# Patient Record
Sex: MALE | Race: OTHER | HISPANIC OR LATINO | URBAN - METROPOLITAN AREA
[De-identification: names, ages, dates, MRNs, and addresses within clinical notes are randomized per-mention and may not be internally consistent; named-entity substitution may affect disease eponyms.]

---

## 2018-07-12 ENCOUNTER — INPATIENT (INPATIENT)
Facility: HOSPITAL | Age: 43
LOS: 3 days | Discharge: HOME | DRG: 897 | End: 2018-07-16
Attending: INTERNAL MEDICINE | Admitting: INTERNAL MEDICINE
Payer: SELF-PAY

## 2018-07-12 VITALS
OXYGEN SATURATION: 98 % | HEIGHT: 64 IN | TEMPERATURE: 99 F | WEIGHT: 134.92 LBS | HEART RATE: 88 BPM | RESPIRATION RATE: 16 BRPM | DIASTOLIC BLOOD PRESSURE: 110 MMHG | SYSTOLIC BLOOD PRESSURE: 184 MMHG

## 2018-07-12 LAB
ALBUMIN SERPL ELPH-MCNC: 3.7 G/DL — SIGNIFICANT CHANGE UP (ref 3.5–5)
ALP SERPL-CCNC: 248 U/L — HIGH (ref 40–120)
ALT FLD-CCNC: 62 U/L DA — HIGH (ref 10–60)
ANION GAP SERPL CALC-SCNC: 8 MMOL/L — SIGNIFICANT CHANGE UP (ref 5–17)
AST SERPL-CCNC: 78 U/L — HIGH (ref 10–40)
BASOPHILS # BLD AUTO: 0.1 K/UL — SIGNIFICANT CHANGE UP (ref 0–0.2)
BASOPHILS NFR BLD AUTO: 1.5 % — SIGNIFICANT CHANGE UP (ref 0–2)
BILIRUB SERPL-MCNC: 0.7 MG/DL — SIGNIFICANT CHANGE UP (ref 0.2–1.2)
BUN SERPL-MCNC: 12 MG/DL — SIGNIFICANT CHANGE UP (ref 7–18)
CALCIUM SERPL-MCNC: 9.2 MG/DL — SIGNIFICANT CHANGE UP (ref 8.4–10.5)
CHLORIDE SERPL-SCNC: 107 MMOL/L — SIGNIFICANT CHANGE UP (ref 96–108)
CO2 SERPL-SCNC: 26 MMOL/L — SIGNIFICANT CHANGE UP (ref 22–31)
CREAT SERPL-MCNC: 0.85 MG/DL — SIGNIFICANT CHANGE UP (ref 0.5–1.3)
EOSINOPHIL # BLD AUTO: 0.1 K/UL — SIGNIFICANT CHANGE UP (ref 0–0.5)
EOSINOPHIL NFR BLD AUTO: 1 % — SIGNIFICANT CHANGE UP (ref 0–6)
GLUCOSE SERPL-MCNC: 90 MG/DL — SIGNIFICANT CHANGE UP (ref 70–99)
HCT VFR BLD CALC: 41.9 % — SIGNIFICANT CHANGE UP (ref 39–50)
HGB BLD-MCNC: 14.1 G/DL — SIGNIFICANT CHANGE UP (ref 13–17)
LYMPHOCYTES # BLD AUTO: 1.2 K/UL — SIGNIFICANT CHANGE UP (ref 1–3.3)
LYMPHOCYTES # BLD AUTO: 15.3 % — SIGNIFICANT CHANGE UP (ref 13–44)
MAGNESIUM SERPL-MCNC: 1.6 MG/DL — SIGNIFICANT CHANGE UP (ref 1.6–2.6)
MCHC RBC-ENTMCNC: 33.6 GM/DL — SIGNIFICANT CHANGE UP (ref 32–36)
MCHC RBC-ENTMCNC: 36.2 PG — HIGH (ref 27–34)
MCV RBC AUTO: 107.8 FL — HIGH (ref 80–100)
MONOCYTES # BLD AUTO: 0.4 K/UL — SIGNIFICANT CHANGE UP (ref 0–0.9)
MONOCYTES NFR BLD AUTO: 5.8 % — SIGNIFICANT CHANGE UP (ref 2–14)
NEUTROPHILS # BLD AUTO: 5.9 K/UL — SIGNIFICANT CHANGE UP (ref 1.8–7.4)
NEUTROPHILS NFR BLD AUTO: 76.5 % — SIGNIFICANT CHANGE UP (ref 43–77)
PLATELET # BLD AUTO: 226 K/UL — SIGNIFICANT CHANGE UP (ref 150–400)
POTASSIUM SERPL-MCNC: 2.9 MMOL/L — CRITICAL LOW (ref 3.5–5.3)
POTASSIUM SERPL-SCNC: 2.9 MMOL/L — CRITICAL LOW (ref 3.5–5.3)
PROT SERPL-MCNC: 8 G/DL — SIGNIFICANT CHANGE UP (ref 6–8.3)
RBC # BLD: 3.89 M/UL — LOW (ref 4.2–5.8)
RBC # FLD: 13.8 % — SIGNIFICANT CHANGE UP (ref 10.3–14.5)
SODIUM SERPL-SCNC: 141 MMOL/L — SIGNIFICANT CHANGE UP (ref 135–145)
WBC # BLD: 7.7 K/UL — SIGNIFICANT CHANGE UP (ref 3.8–10.5)
WBC # FLD AUTO: 7.7 K/UL — SIGNIFICANT CHANGE UP (ref 3.8–10.5)

## 2018-07-12 RX ORDER — SODIUM CHLORIDE 9 MG/ML
2000 INJECTION INTRAMUSCULAR; INTRAVENOUS; SUBCUTANEOUS ONCE
Qty: 0 | Refills: 0 | Status: COMPLETED | OUTPATIENT
Start: 2018-07-12 | End: 2018-07-12

## 2018-07-12 RX ORDER — POTASSIUM CHLORIDE 20 MEQ
60 PACKET (EA) ORAL ONCE
Qty: 0 | Refills: 0 | Status: COMPLETED | OUTPATIENT
Start: 2018-07-12 | End: 2018-07-12

## 2018-07-12 RX ADMIN — Medication 2 MILLIGRAM(S): at 23:16

## 2018-07-12 RX ADMIN — Medication 60 MILLIEQUIVALENT(S): at 23:19

## 2018-07-12 RX ADMIN — SODIUM CHLORIDE 2000 MILLILITER(S): 9 INJECTION INTRAMUSCULAR; INTRAVENOUS; SUBCUTANEOUS at 22:40

## 2018-07-12 NOTE — ED PROVIDER NOTE - OBJECTIVE STATEMENT
43 y/o M pt w/ no PMHx presents c/o seizure-like episode x today. Pt was working when he noticed body shaking uncontrollably. Had syncopal episode. Does not remember falling. Fell into face. Woke up to EMS. EMS told pt that he had a seizure. Did not provide details about the seizure episode. Had similar episode of presyncope in the past. Pt currently feels fatigue and trembling in the ED. Reports having blurry vision lately.  Denies CP, SOB, recent head trauma. Smoker. Drinks a pint of liquor 3 times a week. Relates drinking more than usual lately. Last drink was 3 days ago. Pt has been drinking for years. NKDA. 43 y/o M pt w/ no PMHx presents c/o seizure-like episode x today. Pt was working when he noticed body shaking uncontrollably, following which he remembers awakening to EMS standing around him. States was told he that he had had a seizure, though no further details provided. No prior history. Mild confusion following event. Pt currently feels fatigue and trembling in the ED. Reports having blurry vision lately x2 months. Denies CP, SOB, recent head trauma. Smoker. Drinks a pint of liquor 3 times a week. Relates drinking more than usual lately. Last drink was 3 days ago. Pt has been drinking for years. NKDA.

## 2018-07-12 NOTE — ED PROVIDER NOTE - MEDICAL DECISION MAKING DETAILS
Tremulous, reported seizure, diaphoretic, tachy, concerning for EtOH withdrawal seizure. Head NCAT, CT head negative. ECG unremarkable. Give Ativan with improvement to CIWA of 2. Admitted to internal medicine for further monitoring, w/u, and care.  Other diagnoses: transaminitis, cervical spondylosis, fall initial encounter

## 2018-07-12 NOTE — ED PROVIDER NOTE - CARE PLAN
Principal Discharge DX:	Alcohol withdrawal seizure with complication Principal Discharge DX:	Alcohol withdrawal seizure with complication  Secondary Diagnosis:	Hypokalemia  Secondary Diagnosis:	HTN (hypertension)

## 2018-07-12 NOTE — ED PROVIDER NOTE - PHYSICAL EXAMINATION
Afebrile, HTN, hemodynamically stable, saturating well  Trembling, mild diaphoresis  Head NCAT  PERRL, EOMI, icteric  MM dry  Tachy, reg rhythm, nml S1/S2, no m/r/g  Lungs diffuse rhonchi, no w/r/r  Abd soft, NT, ND, nml BS, no rebound or guarding  AAOx3, CN's 3-12 intact other than mild R tongue deviation, motor 5/5 and sens symmetric in all extrems  ANDRADE spontaneously  Skin warm, well perfused, abrasions to b/l dorsal hands, full wrist/elbow flexion/extension/supin/pronation, 2+ radial pulse, <2 sec cap refill

## 2018-07-13 DIAGNOSIS — F10.239 ALCOHOL DEPENDENCE WITH WITHDRAWAL, UNSPECIFIED: ICD-10-CM

## 2018-07-13 DIAGNOSIS — R74.8 ABNORMAL LEVELS OF OTHER SERUM ENZYMES: ICD-10-CM

## 2018-07-13 DIAGNOSIS — Z29.9 ENCOUNTER FOR PROPHYLACTIC MEASURES, UNSPECIFIED: ICD-10-CM

## 2018-07-13 LAB
ALBUMIN SERPL ELPH-MCNC: 3.1 G/DL — LOW (ref 3.5–5)
ALP SERPL-CCNC: 195 U/L — HIGH (ref 40–120)
ALT FLD-CCNC: 49 U/L DA — SIGNIFICANT CHANGE UP (ref 10–60)
ANION GAP SERPL CALC-SCNC: 10 MMOL/L — SIGNIFICANT CHANGE UP (ref 5–17)
AST SERPL-CCNC: 60 U/L — HIGH (ref 10–40)
BASOPHILS # BLD AUTO: 0.1 K/UL — SIGNIFICANT CHANGE UP (ref 0–0.2)
BASOPHILS NFR BLD AUTO: 1.5 % — SIGNIFICANT CHANGE UP (ref 0–2)
BILIRUB SERPL-MCNC: 0.5 MG/DL — SIGNIFICANT CHANGE UP (ref 0.2–1.2)
BUN SERPL-MCNC: 7 MG/DL — SIGNIFICANT CHANGE UP (ref 7–18)
CALCIUM SERPL-MCNC: 8.1 MG/DL — LOW (ref 8.4–10.5)
CHLORIDE SERPL-SCNC: 109 MMOL/L — HIGH (ref 96–108)
CHOLEST SERPL-MCNC: 169 MG/DL — SIGNIFICANT CHANGE UP (ref 10–199)
CO2 SERPL-SCNC: 22 MMOL/L — SIGNIFICANT CHANGE UP (ref 22–31)
CREAT SERPL-MCNC: 0.55 MG/DL — SIGNIFICANT CHANGE UP (ref 0.5–1.3)
EOSINOPHIL # BLD AUTO: 0.1 K/UL — SIGNIFICANT CHANGE UP (ref 0–0.5)
EOSINOPHIL NFR BLD AUTO: 2 % — SIGNIFICANT CHANGE UP (ref 0–6)
ETHANOL SERPL-MCNC: <3 MG/DL — SIGNIFICANT CHANGE UP (ref 0–10)
FOLATE SERPL-MCNC: 6.5 NG/ML — SIGNIFICANT CHANGE UP
GLUCOSE SERPL-MCNC: 86 MG/DL — SIGNIFICANT CHANGE UP (ref 70–99)
HBA1C BLD-MCNC: 5.1 % — SIGNIFICANT CHANGE UP (ref 4–5.6)
HCT VFR BLD CALC: 37.9 % — LOW (ref 39–50)
HDLC SERPL-MCNC: 81 MG/DL — SIGNIFICANT CHANGE UP (ref 40–125)
HGB BLD-MCNC: 12.7 G/DL — LOW (ref 13–17)
HIV 1 & 2 AB SERPL IA.RAPID: SIGNIFICANT CHANGE UP
LIPID PNL WITH DIRECT LDL SERPL: 70 MG/DL — SIGNIFICANT CHANGE UP
LYMPHOCYTES # BLD AUTO: 1.6 K/UL — SIGNIFICANT CHANGE UP (ref 1–3.3)
LYMPHOCYTES # BLD AUTO: 20.9 % — SIGNIFICANT CHANGE UP (ref 13–44)
MAGNESIUM SERPL-MCNC: 1.5 MG/DL — LOW (ref 1.6–2.6)
MCHC RBC-ENTMCNC: 33.6 GM/DL — SIGNIFICANT CHANGE UP (ref 32–36)
MCHC RBC-ENTMCNC: 36 PG — HIGH (ref 27–34)
MCV RBC AUTO: 107.2 FL — HIGH (ref 80–100)
MONOCYTES # BLD AUTO: 0.5 K/UL — SIGNIFICANT CHANGE UP (ref 0–0.9)
MONOCYTES NFR BLD AUTO: 6.1 % — SIGNIFICANT CHANGE UP (ref 2–14)
NEUTROPHILS # BLD AUTO: 5.2 K/UL — SIGNIFICANT CHANGE UP (ref 1.8–7.4)
NEUTROPHILS NFR BLD AUTO: 69.5 % — SIGNIFICANT CHANGE UP (ref 43–77)
PHOSPHATE SERPL-MCNC: 3.7 MG/DL — SIGNIFICANT CHANGE UP (ref 2.5–4.5)
PLATELET # BLD AUTO: 206 K/UL — SIGNIFICANT CHANGE UP (ref 150–400)
POTASSIUM SERPL-MCNC: 2.8 MMOL/L — CRITICAL LOW (ref 3.5–5.3)
POTASSIUM SERPL-SCNC: 2.8 MMOL/L — CRITICAL LOW (ref 3.5–5.3)
PROT SERPL-MCNC: 6.7 G/DL — SIGNIFICANT CHANGE UP (ref 6–8.3)
RBC # BLD: 3.54 M/UL — LOW (ref 4.2–5.8)
RBC # FLD: 13.3 % — SIGNIFICANT CHANGE UP (ref 10.3–14.5)
SODIUM SERPL-SCNC: 141 MMOL/L — SIGNIFICANT CHANGE UP (ref 135–145)
TOTAL CHOLESTEROL/HDL RATIO MEASUREMENT: 2.1 RATIO — LOW (ref 3.4–9.6)
TRIGL SERPL-MCNC: 88 MG/DL — SIGNIFICANT CHANGE UP (ref 10–149)
TSH SERPL-MCNC: 0.3 UU/ML — LOW (ref 0.34–4.82)
VIT B12 SERPL-MCNC: 256 PG/ML — SIGNIFICANT CHANGE UP (ref 232–1245)
WBC # BLD: 7.5 K/UL — SIGNIFICANT CHANGE UP (ref 3.8–10.5)
WBC # FLD AUTO: 7.5 K/UL — SIGNIFICANT CHANGE UP (ref 3.8–10.5)

## 2018-07-13 PROCEDURE — 99053 MED SERV 10PM-8AM 24 HR FAC: CPT

## 2018-07-13 PROCEDURE — 99291 CRITICAL CARE FIRST HOUR: CPT

## 2018-07-13 PROCEDURE — 95819 EEG AWAKE AND ASLEEP: CPT | Mod: 26

## 2018-07-13 PROCEDURE — 70450 CT HEAD/BRAIN W/O DYE: CPT | Mod: 26

## 2018-07-13 PROCEDURE — 99254 IP/OBS CNSLTJ NEW/EST MOD 60: CPT

## 2018-07-13 PROCEDURE — 72125 CT NECK SPINE W/O DYE: CPT | Mod: 26

## 2018-07-13 RX ORDER — POTASSIUM CHLORIDE 20 MEQ
40 PACKET (EA) ORAL EVERY 4 HOURS
Qty: 0 | Refills: 0 | Status: COMPLETED | OUTPATIENT
Start: 2018-07-13 | End: 2018-07-13

## 2018-07-13 RX ORDER — SODIUM CHLORIDE 9 MG/ML
1000 INJECTION, SOLUTION INTRAVENOUS
Qty: 0 | Refills: 0 | Status: COMPLETED | OUTPATIENT
Start: 2018-07-13 | End: 2018-07-14

## 2018-07-13 RX ORDER — MAGNESIUM SULFATE 500 MG/ML
2 VIAL (ML) INJECTION ONCE
Qty: 0 | Refills: 0 | Status: DISCONTINUED | OUTPATIENT
Start: 2018-07-13 | End: 2018-07-13

## 2018-07-13 RX ORDER — THIAMINE MONONITRATE (VIT B1) 100 MG
100 TABLET ORAL ONCE
Qty: 0 | Refills: 0 | Status: COMPLETED | OUTPATIENT
Start: 2018-07-13 | End: 2018-07-13

## 2018-07-13 RX ORDER — THIAMINE MONONITRATE (VIT B1) 100 MG
100 TABLET ORAL DAILY
Qty: 0 | Refills: 0 | Status: COMPLETED | OUTPATIENT
Start: 2018-07-13 | End: 2018-07-15

## 2018-07-13 RX ORDER — POTASSIUM CHLORIDE 20 MEQ
10 PACKET (EA) ORAL
Qty: 0 | Refills: 0 | Status: COMPLETED | OUTPATIENT
Start: 2018-07-13 | End: 2018-07-13

## 2018-07-13 RX ORDER — MAGNESIUM SULFATE 500 MG/ML
2 VIAL (ML) INJECTION ONCE
Qty: 0 | Refills: 0 | Status: COMPLETED | OUTPATIENT
Start: 2018-07-13 | End: 2018-07-13

## 2018-07-13 RX ORDER — PREGABALIN 225 MG/1
1000 CAPSULE ORAL DAILY
Qty: 0 | Refills: 0 | Status: DISCONTINUED | OUTPATIENT
Start: 2018-07-13 | End: 2018-07-16

## 2018-07-13 RX ORDER — THIAMINE MONONITRATE (VIT B1) 100 MG
100 TABLET ORAL DAILY
Qty: 0 | Refills: 0 | Status: DISCONTINUED | OUTPATIENT
Start: 2018-07-13 | End: 2018-07-13

## 2018-07-13 RX ADMIN — Medication 101 MILLIGRAM(S): at 04:15

## 2018-07-13 RX ADMIN — Medication 40 MILLIEQUIVALENT(S): at 17:38

## 2018-07-13 RX ADMIN — Medication 50 GRAM(S): at 09:10

## 2018-07-13 RX ADMIN — SODIUM CHLORIDE 80 MILLILITER(S): 9 INJECTION, SOLUTION INTRAVENOUS at 06:45

## 2018-07-13 RX ADMIN — Medication 2 MILLIGRAM(S): at 19:20

## 2018-07-13 RX ADMIN — Medication 100 MILLIGRAM(S): at 15:12

## 2018-07-13 RX ADMIN — Medication 20 MILLIEQUIVALENT(S): at 15:13

## 2018-07-13 RX ADMIN — Medication 2 MILLIGRAM(S): at 09:10

## 2018-07-13 RX ADMIN — Medication 100 MILLIEQUIVALENT(S): at 15:13

## 2018-07-13 RX ADMIN — Medication 40 MILLIEQUIVALENT(S): at 15:20

## 2018-07-13 RX ADMIN — Medication 2 MILLIGRAM(S): at 15:12

## 2018-07-13 RX ADMIN — Medication 2 MILLIGRAM(S): at 06:44

## 2018-07-13 RX ADMIN — Medication 2 MILLIGRAM(S): at 17:38

## 2018-07-13 RX ADMIN — Medication 100 MILLIEQUIVALENT(S): at 17:07

## 2018-07-13 RX ADMIN — PREGABALIN 1000 MICROGRAM(S): 225 CAPSULE ORAL at 15:20

## 2018-07-13 NOTE — H&P ADULT - ATTENDING COMMENTS
43 y/o Polish speaking male pt w/ no PMHx presents c/o seizure-like episode that happened today 7/13/18. Patient was working when he noticed body shaking uncontrollably and had syncopal episode. Does not remember falling. Fell into face. Woke up to EMS. EMS told patient that he had a seizure. Did not provide details about the seizure episode. Had similar episode of presyncope in the past. In the ED patient patient refers to feels fatigue and trembling in the ED. Reports having blurry vision lately.  Denies CP, SOB, recent head trauma. Smoker>15 years and drink drinks a bottle of  liquor almost everyday. Relates drinking more than usual lately. Last drink was 3 days ago. Patient has been drinking for years. NKDA.    pt seen in bed, vitals stable, physical exam reveals lungs cta b/l, heart s1s2, abd soft nd nt bs+, ext no edema. labs and diagnostic test result reviewed.    assessment  --  etoh withdrawal seiz,, h/o etoh abuse    plan  --  admit to tele, ativan as per [facility] protocol,  cont preadmit home meds, gi and dvt profilaxis,  cbc, bmp, mg, phos, lipids, tsh, bld cx, ua, ucx,    eeg    neuro cons

## 2018-07-13 NOTE — EEG REPORT - NS EEG TEXT BOX
[facility]  Report of Routine EEG with Video    [facility]: [address], [place] [address], Phone: [phone]  [facility]: [address] [place] [address], Phone: [phone]  Office: [address] [facility] [address], [place] [address], Phone: [phone]    Patient Name: RU PARKER    Age: 42 year  : 1975  Patient ID: -, MRN #: -, Location: -  Referring Physician: DR RICHARDSON    EEG #:   Study Date: 2018		    Technical Information:					  On Instrument: [id number]  Placement and Labeling of Electrodes:  The EEG was performed utilizing 20 channels referential EEG connections (coronal over temporal over parasagittal montage) using all standard 10-20 electrode placements with EKG.  Recording was at a sampling rate of 256 samples per second per channel.  Time synchronized digital video recording was done simultaneously with EEG recording.  A low light infrared camera was used for low light recording.  Hayden and seizure detection algorithms were utilized.    History:  -concern for seizures    Medication	  No Data.	    Study Interpretation:    FINDINGS:  The background was continuous, spontaneously variable and reactive. During wakefulness, the posterior dominant rhythm consisted of symmetric, well-modulated 9-10 Hz activity, with amplitude to 30 uV, that attenuated to eye opening. Excessive low amplitude frontal beta was noted in wakefulness.    Generalized Slowing:  None was present.    Focal Slowing:   None was present.    Sleep Background:  Drowsiness was characterized by fragmentation, attenuation, and slowing of the background activity.    Sleep was characterized by the presence of vertex waves, symmetric spindles, and K-complexes.    Non-Epileptiform Abnormalities:  -Excessive Beta, Generalized, Maximum BiFrontal    Interictal Epileptiform Abnormalities:   None were present.    Ictal Abnormalities:  None were present.    Activation Procedures:   Hyperventilation was not performed.    Photic stimulation was performed and did not elicit any abnormalities.      Artifacts:  Intermittent myogenic and movement artifacts were noted.    ECG:  The heart rate on single channel ECG was predominantly between 60-80 BPM.    EEG Summary/Classification:  Abnormal EEG study in the awake, drowsy, and asleep states.  -Excessive Beta, Generalized, Maximum BiFrontal    EEG Impression/Clinical Correlate:  Abnormal EEG.  The excessive fast activity on this EEG may be an effect of medications (ie benzodiazepines, barbiturates).  No epileptiform discharges or electrographic seizures were recorded.    Ranjith Valdes MD  Attending Physician, [facility] [place]

## 2018-07-13 NOTE — ED ADULT NURSE REASSESSMENT NOTE - NS ED NURSE REASSESS COMMENT FT1
Patient was received from CAITY Thomson . Patient is hemodynamically stable; however, trying to get out of bed . Patient was educated about safety and ativan administered. Patient denies any medical complaints. Patient was received from CAITY Thomson . Patient is hemodynamically stable; however, trying to get out of bed and refuses to keep cardiac monitor on despite being explained medical necessity and educated about safety . Patient was educated about safety and ativan administered. Patient denies any medical complaints.

## 2018-07-13 NOTE — H&P ADULT - NSHPPHYSICALEXAM_GEN_ALL_CORE
General - NAD, sleeping but arousable  ENT - Nonicteric sclerae, PERRLA, EOMI. Oropharynx clear.   Neck - No noticeable or palpable swelling, redness or rash around throat or on face  Lymph Nodes - No lymphadenopathy  Cardiovascular - RRR no m/r/g, no JVD, no carotid bruits  Lungs - Clear to ascultation, no use of accessory muscles, no crackles or wheezes.  Abdomen - Normal bowel sounds, abdomen soft and nontender  Extremities - No edema, cyanosis or clubbing  Neurological – Alert and oriented x 3

## 2018-07-13 NOTE — CONSULT NOTE ADULT - SUBJECTIVE AND OBJECTIVE BOX
Patient is a 42y old  Male who presents with a chief complaint of Alcohol seizures (13 Jul 2018 05:21)      HPI:  41 y/o Danish speaking male pt w/ no PMHx presents c/o seizure-like episode that happened today 7/13/18. Patient was working when he noticed body shaking uncontrollably and had syncopal episode. Does not remember falling. Fell into face. Woke up to EMS. EMS told patient that he had a seizure. Did not provide details about the seizure episode. Had similar episode of presyncope in the past. In the ED patient patient refers to feels fatigue and trembling in the ED. Reports having blurry vision lately.  Denies CP, SOB, recent head trauma. Smoker>15 years and drink drinks a bottle of  liquor almost everyday. Relates drinking more than usual lately. Last drink was 3 days ago. Patient has been drinking for years. NKDA. (13 Jul 2018 05:21)         The seizure is described as  Seizure onset at  The frequency of seizure is  The seizure is brought up by  The patient has been previously on     History of head trauma/ concussion:  History of meningitis:  History of febrile seizures:  Family history of seizures:    Neurological Review of Systems:  No difficulty with language.  No vision loss or double vision.  No dizziness, vertigo or new hearing loss.  No difficulty with speech or swallowing.  No focal weakness.  No focal sensory changes.  No numbness or tingling in the bilateral lower extremities.  No difficulty with balance.  No difficulty with ambulation.        MEDICATIONS  (STANDING):  cyanocobalamin 1000 MICROGram(s) Oral daily  LORazepam   Injectable 2 milliGRAM(s) IV Push every 4 hours  multivitamin/thiamine/folic acid in sodium chloride 0.9% 1000 milliLiter(s) (80 mL/Hr) IV Continuous <Continuous>  thiamine Injectable 100 milliGRAM(s) IntraMuscular daily    MEDICATIONS  (PRN):  LORazepam   Injectable 2 milliGRAM(s) IntraMuscular every 4 hours PRN Agitation    Allergies    No Known Allergies    Intolerances      PAST MEDICAL & SURGICAL HISTORY:    FAMILY HISTORY:  No pertinent family history in first degree relatives    SOCIAL HISTORY: non smoker/ former smoker/ active smoker    Review of Systems:  Constitutional: No generalized weakness. No fevers or chills.                    Eyes, Ears, Mouth, Throat: No vision loss   Respiratory: No shortness of breath or cough.                                Cardiovascular: No chest pain or palpitations  Gastrointestinal: No nausea or vomiting.                                         Genitourinary: No urinary incontinence or burning on urination.  Musculoskeletal: No joint pain.                                                           Dermatologic: No rash.  Neurological: as per HPI                                                                      Psychiatric: No behavioral problems.  Endocrine: No known hypoglycemia.               Hematologic/Lymphatic: No easy bleeding.    O:  Vital Signs Last 24 Hrs  T(C): 36.8 (13 Jul 2018 07:26), Max: 37.7 (13 Jul 2018 00:08)  T(F): 98.3 (13 Jul 2018 07:26), Max: 99.9 (13 Jul 2018 00:08)  HR: 66 (13 Jul 2018 07:26) (66 - 110)  BP: 113/88 (13 Jul 2018 07:26) (113/88 - 184/110)  BP(mean): --  RR: 20 (13 Jul 2018 07:26) (16 - 20)  SpO2: 98% (13 Jul 2018 07:26) (98% - 99%)    General Exam:   General appearance: No acute distress                 Cardiovascular: Pedal dorsalis pulses intact bilaterally    Mental Status: Orientated to self, date and place.  Attention intact.  No dysarthria, aphasia or neglect.  Knowledge intact.  Registration intact.  Short and long term memory grossly intact.      Cranial Nerves: CN I - not tested.  PERRL, EOMI, VFF, no nystagmus or diplopia.  No APD.  Fundi not visualized.  CN V1-3 intact to light touch and pinprick.  No facial asymmetry.  Hearing intact to finger rub bilaterally.  Tongue, uvula and palate midline.  Sternocleidomastoid and Trapezius intact bilaterally.    Motor:   Tone: normal.                  Strength intact throughout  No pronator drift bilaterally                      No dysmetria on finger-nose-finger or heel-shin-heel  No truncal ataxia.  No resting, postural or action tremor.  No myoclonus.    Sensation: intact to light touch, pinprick, vibration and proprioception    Deep Tendon Reflexes: 1+ bilateral biceps, triceps, brachioradialis, knee and ankle  Toes flexor bilaterally    Gait: normal and stable.  Rhomberg -felicia.    Other:     LABS:                        12.7   7.5   )-----------( 206      ( 13 Jul 2018 06:36 )             37.9     07-12    141  |  107  |  12  ----------------------------<  90  2.9<LL>   |  26  |  0.85    Ca    9.2      12 Jul 2018 22:27  Phos  3.7     07-13  Mg     1.5     07-13    TPro  8.0  /  Alb  3.7  /  TBili  0.7  /  DBili  x   /  AST  78<H>  /  ALT  62<H>  /  AlkPhos  248<H>  07-12    Alcohol, Blood (07.13.18 @ 02:13)    Alcohol, Blood: <3: TOXIC CONCENTRATIONS (mg/dL):  Flushing, Slowing of  Reflexes, Impaired Visual Acuity:     Depression of CNS:    > 100  Fatalities Reported:       > 400  Results reported as a "< number" are below reliably detectable limits and  considered negative  These ranges are intended as general guidelines.  Alcohol metabolism can vary widely among individuals.  This test  is approved for clinical and not for forensic purposes. mg/dL          RADIOLOGY & ADDITIONAL STUDIES:    EKG:  tele:  TTE:  EEG:    Impression:         Recommendations:  1.         UA/UCx, Chest Xray, Utox   4.         EEG  6.         Anti-epileptic:  9.         Seizure and fall precautions  10.        The patient has been advised that driving is not allowed for 1 year after a seizure by [place] law.  The patient is advised to avoid swimming and any activities that may put their life at danger shall they have a seizure.    11.        DVT PPx    Thank you for the courtesy of this consult. Patient is a 42y old  Male who presents with a chief complaint of Alcohol seizures (13 Jul 2018 05:21)      HPI:  41 y/o Azeri speaking male pt w/ no PMHx presents c/o seizure-like episode that happened today 7/13/18. Patient was working when he noticed body shaking uncontrollably and had syncopal episode. Does not remember falling. Fell into face. Woke up to EMS. EMS told patient that he had a seizure. Did not provide details about the seizure episode. Had similar episode of presyncope in the past. In the ED patient patient refers to feels fatigue and trembling in the ED. Reports having blurry vision lately.  Denies CP, SOB, recent head trauma. Smoker>15 years and drink drinks a bottle of  liquor almost everyday. Relates drinking more than usual lately. Last drink was 3 days ago. Patient has been drinking for years. NKDA. (13 Jul 2018 05:21)         The seizure is described as  Seizure onset at  The frequency of seizure is  The seizure is brought up by  The patient has been previously on     History of head trauma/ concussion:  History of meningitis:  History of febrile seizures:  Family history of seizures:    Neurological Review of Systems:  No difficulty with language.  No vision loss or double vision.  No dizziness, vertigo or new hearing loss.  No difficulty with speech or swallowing.  No focal weakness.  No focal sensory changes.  No numbness or tingling in the bilateral lower extremities.  No difficulty with balance.  No difficulty with ambulation.        MEDICATIONS  (STANDING):  cyanocobalamin 1000 MICROGram(s) Oral daily  LORazepam   Injectable 2 milliGRAM(s) IV Push every 4 hours  multivitamin/thiamine/folic acid in sodium chloride 0.9% 1000 milliLiter(s) (80 mL/Hr) IV Continuous <Continuous>  thiamine Injectable 100 milliGRAM(s) IntraMuscular daily    MEDICATIONS  (PRN):  LORazepam   Injectable 2 milliGRAM(s) IntraMuscular every 4 hours PRN Agitation    Allergies    No Known Allergies    Intolerances      PAST MEDICAL & SURGICAL HISTORY:    FAMILY HISTORY:  No pertinent family history in first degree relatives    SOCIAL HISTORY: non smoker/ former smoker/ active smoker    Review of Systems:  Constitutional: No generalized weakness. No fevers or chills.                    Eyes, Ears, Mouth, Throat: No vision loss   Respiratory: No shortness of breath or cough.                                Cardiovascular: No chest pain or palpitations  Gastrointestinal: No nausea or vomiting.                                         Genitourinary: No urinary incontinence or burning on urination.  Musculoskeletal: No joint pain.                                                           Dermatologic: No rash.  Neurological: as per HPI                                                                      Psychiatric: No behavioral problems.  Endocrine: No known hypoglycemia.               Hematologic/Lymphatic: No easy bleeding.    O:  Vital Signs Last 24 Hrs  T(C): 36.8 (13 Jul 2018 07:26), Max: 37.7 (13 Jul 2018 00:08)  T(F): 98.3 (13 Jul 2018 07:26), Max: 99.9 (13 Jul 2018 00:08)  HR: 66 (13 Jul 2018 07:26) (66 - 110)  BP: 113/88 (13 Jul 2018 07:26) (113/88 - 184/110)  BP(mean): --  RR: 20 (13 Jul 2018 07:26) (16 - 20)  SpO2: 98% (13 Jul 2018 07:26) (98% - 99%)    General Exam:   General appearance: No acute distress                 Cardiovascular: Pedal dorsalis pulses intact bilaterally    Mental Status: Orientated to self, date and place.  Attention intact.  No dysarthria, aphasia or neglect.  Knowledge intact.  Registration intact.  Short and long term memory grossly intact.      Cranial Nerves: CN I - not tested.  PERRL, EOMI, VFF, no nystagmus or diplopia.  No APD.  Fundi not visualized.  CN V1-3 intact to light touch and pinprick.  No facial asymmetry.  Hearing intact to finger rub bilaterally.  Tongue, uvula and palate midline.  Sternocleidomastoid and Trapezius intact bilaterally.    Motor:   Tone: normal.                  Strength intact throughout  No pronator drift bilaterally                      No dysmetria on finger-nose-finger or heel-shin-heel  No truncal ataxia.  No resting, postural or action tremor.  No myoclonus.    Sensation: intact to light touch, pinprick, vibration and proprioception    Deep Tendon Reflexes: 1+ bilateral biceps, triceps, brachioradialis, knee and ankle  Toes flexor bilaterally    Gait: normal and stable.  Rhomberg -felicia.    Other:     LABS:                        12.7   7.5   )-----------( 206      ( 13 Jul 2018 06:36 )             37.9     07-12    141  |  107  |  12  ----------------------------<  90  2.9<LL>   |  26  |  0.85    Ca    9.2      12 Jul 2018 22:27  Phos  3.7     07-13  Mg     1.5     07-13    TPro  8.0  /  Alb  3.7  /  TBili  0.7  /  DBili  x   /  AST  78<H>  /  ALT  62<H>  /  AlkPhos  248<H>  07-12    Alcohol, Blood (07.13.18 @ 02:13)    Alcohol, Blood: <3: TOXIC CONCENTRATIONS (mg/dL):  Flushing, Slowing of  Reflexes, Impaired Visual Acuity:     Depression of CNS:    > 100  Fatalities Reported:       > 400  Results reported as a "< number" are below reliably detectable limits and  considered negative  These ranges are intended as general guidelines.  Alcohol metabolism can vary widely among individuals.  This test  is approved for clinical and not for forensic purposes. mg/dL          RADIOLOGY & ADDITIONAL STUDIES:    EKG:  < from: CT Head No Cont (07.13.18 @ 02:29) > (images reviwed)  IMPRESSION:  Head CT: No acute intracranial hemorrhage or calvarial fracture.    Cervical spine CT: No acute cervical spine fracture or evidence of   traumatic malalignment. Cervical spondylosis as above.    < end of copied text >      < from: CT Cervical Spine No Cont (07.13.18 @ 02:29) >  Cervical spine CT:  There is straightening of the normal cervical lordosis. There are no   acute fractures or evidence of traumatic malalignment. The vertebral body   heights are maintained. Multilevel facet alignment is preserved. The   atlanto-dental interval is within normal limits and the craniocervical   junction is unremarkable. There are no  suspicious osteoblastic or lytic   lesions.    There is no prevertebral or epidural hematoma.    The spinal canal is congenitally normal in AP and transverse diameters.   There are multilevel degenerative changes including intervertebral disc   space narrowing, small disc osteophyte complexes and facet arthropathy.   Findings contribute to multilevel canal and foraminal stenosis, most   notable at C4/C5 and C5/C6, where there is at least mild to perhaps   moderate stenosis.     The visualized soft tissues of the neck have an unremarkable unenhanced   appearance. The lung apices are clear.      < end of copied text >      Impression:         Recommendations:  1.         UA/UCx, Chest Xray, Utox   4.         EEG  6.         Anti-epileptic:  9.         Seizure and fall precautions  10.        The patient has been advised that driving is not allowed for 1 year after a seizure by [facility] law.  The patient is advised to avoid swimming and any activities that may put their life at danger shall they have a seizure.    11.        DVT PPx    Thank you for the courtesy of this consult. Patient is a 42y old  Male who presents with a chief complaint of Alcohol seizures (13 Jul 2018 05:21)      HPI:  43 y/o Nepali speaking male pt w/ no PMHx presents c/o seizure-like episode that happened today 7/13/18. Patient was working when he noticed body shaking uncontrollably and had syncopal episode. Does not remember falling. Fell into face. Woke up to EMS. EMS told patient that he had a seizure. Did not provide details about the seizure episode. The patient denies any seziures in the past. Smoker>15 years and drink drinks a bottle of  liquor almost everyday. Relates drinking more than usual lately. Last drink was 3 days ago. Patient has been drinking for years.     Neurological Review of Systems:  No difficulty with language.  No double vision.  No dizziness, vertigo or new hearing loss.  No difficulty with speech or swallowing.  No focal weakness.  No focal sensory changes.  No difficulty with balance.  No difficulty with ambulation.        MEDICATIONS  (STANDING):  cyanocobalamin 1000 MICROGram(s) Oral daily  LORazepam   Injectable 2 milliGRAM(s) IV Push every 4 hours  multivitamin/thiamine/folic acid in sodium chloride 0.9% 1000 milliLiter(s) (80 mL/Hr) IV Continuous <Continuous>  thiamine Injectable 100 milliGRAM(s) IntraMuscular daily    MEDICATIONS  (PRN):  LORazepam   Injectable 2 milliGRAM(s) IntraMuscular every 4 hours PRN Agitation    Allergies    No Known Allergies    Intolerances      PAST MEDICAL & SURGICAL HISTORY:    FAMILY HISTORY:  No pertinent family history in first degree relatives    SOCIAL HISTORY:  active smoker    Review of Systems:  Constitutional: No fevers.                    Eyes, Ears, Mouth, Throat: No vision loss   Respiratory: No cough.                                Cardiovascular: No chest pain.  Gastrointestinal: No vomiting.                                         Genitourinary: No burning on urination.  Musculoskeletal: No joint pain.                                                           Dermatologic: No rash.  Neurological: as per HPI                                                                      Psychiatric: No behavioral problems.  Endocrine: No known hypoglycemia.               Hematologic/Lymphatic: No easy bleeding.    O:  Vital Signs Last 24 Hrs  T(C): 36.8 (13 Jul 2018 07:26), Max: 37.7 (13 Jul 2018 00:08)  T(F): 98.3 (13 Jul 2018 07:26), Max: 99.9 (13 Jul 2018 00:08)  HR: 66 (13 Jul 2018 07:26) (66 - 110)  BP: 113/88 (13 Jul 2018 07:26) (113/88 - 184/110)  BP(mean): --  RR: 20 (13 Jul 2018 07:26) (16 - 20)  SpO2: 98% (13 Jul 2018 07:26) (98% - 99%)    General Exam:   General appearance: No acute distress                 Cardiovascular: Pedal dorsalis pulses intact bilaterally    Mental Status: Orientated to self, date and place.  Attention decreased, patient requires verbal stimulation to arouse briefly.  No dysarthria, aphasia or neglect.  Unable to assess Knowledge, Registration and memory due to attention.      Cranial Nerves: CN I - not tested.  PERRL, EOMI, VFF, no nystagmus or diplopia.  No APD.  Fundi not visualized.  CN V1-3 intact to light touch.  No facial asymmetry.  Hearing intact to finger rub bilaterally.  Tongue, uvula and palate midline.  Sternocleidomastoid and Trapezius intact bilaterally.    Motor:   Tone: normal.                  Strength intact throughout  No pronator drift bilaterally                      No dysmetria on finger-nose-finger or heel-shin-heel    Sensation: intact to light touch    Deep Tendon Reflexes: 1+ bilateral biceps, triceps, brachioradialis, knee and ankle  Toes flexor bilaterally    Gait: deferred due to mental status    Other:     LABS:                        12.7   7.5   )-----------( 206      ( 13 Jul 2018 06:36 )             37.9     07-12    141  |  107  |  12  ----------------------------<  90  2.9<LL>   |  26  |  0.85    Ca    9.2      12 Jul 2018 22:27  Phos  3.7     07-13  Mg     1.5     07-13    TPro  8.0  /  Alb  3.7  /  TBili  0.7  /  DBili  x   /  AST  78<H>  /  ALT  62<H>  /  AlkPhos  248<H>  07-12    Alcohol, Blood (07.13.18 @ 02:13)    Alcohol, Blood: <3: TOXIC CONCENTRATIONS (mg/dL):  Flushing, Slowing of  Reflexes, Impaired Visual Acuity:     Depression of CNS:    > 100  Fatalities Reported:       > 400  Results reported as a "< number" are below reliably detectable limits and  considered negative  These ranges are intended as general guidelines.  Alcohol metabolism can vary widely among individuals.  This test  is approved for clinical and not for forensic purposes. mg/dL          RADIOLOGY & ADDITIONAL STUDIES:    tele: NSR  < from: CT Head No Cont (07.13.18 @ 02:29) > (images reviwed)  IMPRESSION:  Head CT: No acute intracranial hemorrhage or calvarial fracture.    Cervical spine CT: No acute cervical spine fracture or evidence of   traumatic malalignment. Cervical spondylosis as above.    < end of copied text >      < from: CT Cervical Spine No Cont (07.13.18 @ 02:29) >  Cervical spine CT:  There is straightening of the normal cervical lordosis. There are no   acute fractures or evidence of traumatic malalignment. The vertebral body   heights are maintained. Multilevel facet alignment is preserved. The   atlanto-dental interval is within normal limits and the craniocervical   junction is unremarkable. There are no  suspicious osteoblastic or lytic   lesions.    There is no prevertebral or epidural hematoma.    The spinal canal is congenitally normal in AP and transverse diameters.   There are multilevel degenerative changes including intervertebral disc   space narrowing, small disc osteophyte complexes and facet arthropathy.   Findings contribute to multilevel canal and foraminal stenosis, most   notable at C4/C5 and C5/C6, where there is at least mild to perhaps   moderate stenosis.     The visualized soft tissues of the neck have an unremarkable unenhanced   appearance. The lung apices are clear.      < end of copied text >

## 2018-07-13 NOTE — CONSULT NOTE ADULT - ASSESSMENT
Impression:  Alcohol withdraw seizure       Recommendations:  1.         UA/UCx, Chest Xray, Utox   2.         EEG  3.         Anti-epileptic: none for now  4.         Seizure and fall precautions  5.        The patient has been advised that driving is not allowed for 1 year after a seizure by [place] law.    6.        DVT PPx  7. treat for alcohol withdraw as per primary team    Thank you for the courtesy of this consult. Impression:  Alcohol withdraw seizure       Recommendations:  1.         UA/UCx, Chest Xray, Utox   2.         EEG  3.         Anti-epileptic: none for now  4.         Seizure and fall precautions  5.        The patient has been advised that driving is not allowed for 1 year after a seizure by [place] law.    6.        DVT PPx  7. treat for alcohol withdraw as per primary team    Thank you for the courtesy of this consult.      neuro addendum:    EEG Impression/Clinical Correlate:  Abnormal EEG.  The excessive fast activity on this EEG may be an effect of medications (ie benzodiazepines, barbiturates).  No epileptiform discharges or electrographic seizures were recorded.    pls cw plan as above

## 2018-07-13 NOTE — H&P ADULT - ASSESSMENT
HPI: 43 y/o Tanzanian speaking male pt w/ no PMHx presents c/o seizure-like episode that happened today 7/13/18. Patient was working when he noticed body shaking uncontrollably and had syncopal episode. Does not remember falling. Fell into face. Woke up to EMS. EMS told patient that he had a seizure. Did not provide details about the seizure episode. Had similar episode of presyncope in the past. In the ED patient refers to feels fatigue and trembling in the ED. Reports having blurry vision lately.  Denies CP, SOB, recent head trauma. Smoker>15 years and drink drinks a bottle of  liquor almost everyday. Relates drinking more than usual lately. Last drink was 3 days ago. Patient has been drinking for years. NKDA    ED course: Patient examined at bedside Sleeping but arousable. VS WNL upon my evaluation came initially tachycardic    Labs significant for AST> ALT Likely due to alcohol abuse.   Imaging significant for CT of the head and spine: No acute intracranial hemorrhage or calvarial fracture. Cervical spine CT: No acute cervical spine fracture or evidence of   traumatic malalignment.     Patient will be admitted to the floor for  Alcohol withdrawal seizures

## 2018-07-13 NOTE — H&P ADULT - PROBLEM SELECTOR PLAN 3
IMPROVE VTE score: 1  No need for prophylactic measures     [ ] Previous VTE                                    3  [ ] Thrombophilia                                  2  [ ] Lower limb paralysis                        2  (unable to hold up >15 seconds)    [ ] Current Cancer (within 6 months)        2   [x] Immobilization > 24 hrs                    1  [ ] ICU/CCU stay > 24 hrs                      1  [] Age > 60                                         1

## 2018-07-13 NOTE — H&P ADULT - HISTORY OF PRESENT ILLNESS
43 y/o Montserratian speaking male pt w/ no PMHx presents c/o seizure-like episode that happened today 7/13/18. Patient was working when he noticed body shaking uncontrollably and had syncopal episode. Does not remember falling. Fell into face. Woke up to EMS. EMS told patient that he had a seizure. Did not provide details about the seizure episode. Had similar episode of presyncope in the past. In the ED patient patient refers to feels fatigue and trembling in the ED. Reports having blurry vision lately.  Denies CP, SOB, recent head trauma. Smoker>15 years and drink drinks a bottle of  liquor almost everyday. Relates drinking more than usual lately. Last drink was 3 days ago. Patient has been drinking for years. NKDA.

## 2018-07-13 NOTE — H&P ADULT - PROBLEM SELECTOR PLAN 1
Admitted for alchol withdrawal   -Will Manage with Ativan PRN 2mg q4hrs for CIWA of >7 and Ativan 2mg q4hrs standing dose  -Will Manage with Banana bag containing Thiamine, Folate, Multivitamins  -Will get B12, Folate levels and monitor CMP daily  -Will reduce ativan dosages as posible.

## 2018-07-14 LAB
ALBUMIN SERPL ELPH-MCNC: 2.9 G/DL — LOW (ref 3.5–5)
ALP SERPL-CCNC: 185 U/L — HIGH (ref 40–120)
ALT FLD-CCNC: 46 U/L DA — SIGNIFICANT CHANGE UP (ref 10–60)
ANION GAP SERPL CALC-SCNC: 9 MMOL/L — SIGNIFICANT CHANGE UP (ref 5–17)
AST SERPL-CCNC: 52 U/L — HIGH (ref 10–40)
BILIRUB SERPL-MCNC: 0.6 MG/DL — SIGNIFICANT CHANGE UP (ref 0.2–1.2)
BUN SERPL-MCNC: 5 MG/DL — LOW (ref 7–18)
CALCIUM SERPL-MCNC: 8.7 MG/DL — SIGNIFICANT CHANGE UP (ref 8.4–10.5)
CHLORIDE SERPL-SCNC: 109 MMOL/L — HIGH (ref 96–108)
CO2 SERPL-SCNC: 22 MMOL/L — SIGNIFICANT CHANGE UP (ref 22–31)
CREAT SERPL-MCNC: 0.51 MG/DL — SIGNIFICANT CHANGE UP (ref 0.5–1.3)
GLUCOSE SERPL-MCNC: 85 MG/DL — SIGNIFICANT CHANGE UP (ref 70–99)
HCT VFR BLD CALC: 39.9 % — SIGNIFICANT CHANGE UP (ref 39–50)
HGB BLD-MCNC: 13.3 G/DL — SIGNIFICANT CHANGE UP (ref 13–17)
MAGNESIUM SERPL-MCNC: 1.9 MG/DL — SIGNIFICANT CHANGE UP (ref 1.6–2.6)
MCHC RBC-ENTMCNC: 33.4 GM/DL — SIGNIFICANT CHANGE UP (ref 32–36)
MCHC RBC-ENTMCNC: 35.9 PG — HIGH (ref 27–34)
MCV RBC AUTO: 107.6 FL — HIGH (ref 80–100)
PHOSPHATE SERPL-MCNC: 3.5 MG/DL — SIGNIFICANT CHANGE UP (ref 2.5–4.5)
PLATELET # BLD AUTO: 224 K/UL — SIGNIFICANT CHANGE UP (ref 150–400)
POTASSIUM SERPL-MCNC: 3.5 MMOL/L — SIGNIFICANT CHANGE UP (ref 3.5–5.3)
POTASSIUM SERPL-SCNC: 3.5 MMOL/L — SIGNIFICANT CHANGE UP (ref 3.5–5.3)
PROT SERPL-MCNC: 6.5 G/DL — SIGNIFICANT CHANGE UP (ref 6–8.3)
RBC # BLD: 3.71 M/UL — LOW (ref 4.2–5.8)
RBC # FLD: 13.6 % — SIGNIFICANT CHANGE UP (ref 10.3–14.5)
SODIUM SERPL-SCNC: 140 MMOL/L — SIGNIFICANT CHANGE UP (ref 135–145)
WBC # BLD: 6.3 K/UL — SIGNIFICANT CHANGE UP (ref 3.8–10.5)
WBC # FLD AUTO: 6.3 K/UL — SIGNIFICANT CHANGE UP (ref 3.8–10.5)

## 2018-07-14 RX ADMIN — Medication 2 MILLIGRAM(S): at 20:54

## 2018-07-14 RX ADMIN — Medication 2 MILLIGRAM(S): at 02:42

## 2018-07-14 RX ADMIN — SODIUM CHLORIDE 80 MILLILITER(S): 9 INJECTION, SOLUTION INTRAVENOUS at 10:14

## 2018-07-14 RX ADMIN — Medication 100 MILLIGRAM(S): at 11:31

## 2018-07-14 RX ADMIN — Medication 2 MILLIGRAM(S): at 11:31

## 2018-07-14 RX ADMIN — Medication 2 MILLIGRAM(S): at 18:46

## 2018-07-14 RX ADMIN — Medication 2 MILLIGRAM(S): at 23:19

## 2018-07-14 RX ADMIN — PREGABALIN 1000 MICROGRAM(S): 225 CAPSULE ORAL at 11:31

## 2018-07-14 RX ADMIN — SODIUM CHLORIDE 80 MILLILITER(S): 9 INJECTION, SOLUTION INTRAVENOUS at 18:44

## 2018-07-14 RX ADMIN — Medication 2 MILLIGRAM(S): at 10:16

## 2018-07-14 RX ADMIN — Medication 2 MILLIGRAM(S): at 06:02

## 2018-07-14 RX ADMIN — Medication 2 MILLIGRAM(S): at 15:19

## 2018-07-14 NOTE — PROGRESS NOTE ADULT - PROBLEM SELECTOR PLAN 1
Admitted for alchol withdrawal   Monitor on CIWA protocol, Last CIWA score of 6, received 10mg ativan in last 24 hour.  -Will Manage with Ativan PRN 2mg q4hrs for CIWA of >7 and Ativan 2mg q4hrs standing dose  -Will Manage with Banana bag containing Thiamine, Folate, Multivitamins  -Will reduce ativan dosages as possible.  EEG noted : will consult neurology

## 2018-07-15 LAB
ALBUMIN SERPL ELPH-MCNC: 2.9 G/DL — LOW (ref 3.5–5)
ALP SERPL-CCNC: 176 U/L — HIGH (ref 40–120)
ALT FLD-CCNC: 40 U/L DA — SIGNIFICANT CHANGE UP (ref 10–60)
ANION GAP SERPL CALC-SCNC: 10 MMOL/L — SIGNIFICANT CHANGE UP (ref 5–17)
AST SERPL-CCNC: 42 U/L — HIGH (ref 10–40)
BASOPHILS # BLD AUTO: 0.1 K/UL — SIGNIFICANT CHANGE UP (ref 0–0.2)
BASOPHILS NFR BLD AUTO: 0.7 % — SIGNIFICANT CHANGE UP (ref 0–2)
BILIRUB SERPL-MCNC: 0.3 MG/DL — SIGNIFICANT CHANGE UP (ref 0.2–1.2)
BUN SERPL-MCNC: 10 MG/DL — SIGNIFICANT CHANGE UP (ref 7–18)
CALCIUM SERPL-MCNC: 8.7 MG/DL — SIGNIFICANT CHANGE UP (ref 8.4–10.5)
CHLORIDE SERPL-SCNC: 106 MMOL/L — SIGNIFICANT CHANGE UP (ref 96–108)
CO2 SERPL-SCNC: 22 MMOL/L — SIGNIFICANT CHANGE UP (ref 22–31)
CREAT SERPL-MCNC: 0.59 MG/DL — SIGNIFICANT CHANGE UP (ref 0.5–1.3)
EOSINOPHIL # BLD AUTO: 0.2 K/UL — SIGNIFICANT CHANGE UP (ref 0–0.5)
EOSINOPHIL NFR BLD AUTO: 1.5 % — SIGNIFICANT CHANGE UP (ref 0–6)
GLUCOSE SERPL-MCNC: 99 MG/DL — SIGNIFICANT CHANGE UP (ref 70–99)
HCT VFR BLD CALC: 38.4 % — LOW (ref 39–50)
HGB BLD-MCNC: 12.9 G/DL — LOW (ref 13–17)
LYMPHOCYTES # BLD AUTO: 1.4 K/UL — SIGNIFICANT CHANGE UP (ref 1–3.3)
LYMPHOCYTES # BLD AUTO: 10.7 % — LOW (ref 13–44)
MAGNESIUM SERPL-MCNC: 1.9 MG/DL — SIGNIFICANT CHANGE UP (ref 1.6–2.6)
MCHC RBC-ENTMCNC: 33.5 GM/DL — SIGNIFICANT CHANGE UP (ref 32–36)
MCHC RBC-ENTMCNC: 35.8 PG — HIGH (ref 27–34)
MCV RBC AUTO: 106.6 FL — HIGH (ref 80–100)
MONOCYTES # BLD AUTO: 1.1 K/UL — HIGH (ref 0–0.9)
MONOCYTES NFR BLD AUTO: 8.2 % — SIGNIFICANT CHANGE UP (ref 2–14)
NEUTROPHILS # BLD AUTO: 10.6 K/UL — HIGH (ref 1.8–7.4)
NEUTROPHILS NFR BLD AUTO: 78.9 % — HIGH (ref 43–77)
PHOSPHATE SERPL-MCNC: 2.8 MG/DL — SIGNIFICANT CHANGE UP (ref 2.5–4.5)
PLATELET # BLD AUTO: 222 K/UL — SIGNIFICANT CHANGE UP (ref 150–400)
POTASSIUM SERPL-MCNC: 3.5 MMOL/L — SIGNIFICANT CHANGE UP (ref 3.5–5.3)
POTASSIUM SERPL-SCNC: 3.5 MMOL/L — SIGNIFICANT CHANGE UP (ref 3.5–5.3)
PROT SERPL-MCNC: 6.8 G/DL — SIGNIFICANT CHANGE UP (ref 6–8.3)
RBC # BLD: 3.61 M/UL — LOW (ref 4.2–5.8)
RBC # FLD: 13 % — SIGNIFICANT CHANGE UP (ref 10.3–14.5)
SODIUM SERPL-SCNC: 138 MMOL/L — SIGNIFICANT CHANGE UP (ref 135–145)
WBC # BLD: 13.4 K/UL — HIGH (ref 3.8–10.5)
WBC # FLD AUTO: 13.4 K/UL — HIGH (ref 3.8–10.5)

## 2018-07-15 RX ADMIN — Medication 2 MILLIGRAM(S): at 10:42

## 2018-07-15 RX ADMIN — Medication 2 MILLIGRAM(S): at 05:54

## 2018-07-15 RX ADMIN — Medication 2 MILLIGRAM(S): at 02:04

## 2018-07-15 RX ADMIN — PREGABALIN 1000 MICROGRAM(S): 225 CAPSULE ORAL at 11:30

## 2018-07-15 RX ADMIN — Medication 100 MILLIGRAM(S): at 11:29

## 2018-07-15 RX ADMIN — Medication 2 MILLIGRAM(S): at 17:39

## 2018-07-16 VITALS
OXYGEN SATURATION: 100 % | RESPIRATION RATE: 18 BRPM | SYSTOLIC BLOOD PRESSURE: 145 MMHG | HEART RATE: 87 BPM | TEMPERATURE: 98 F | DIASTOLIC BLOOD PRESSURE: 88 MMHG

## 2018-07-16 LAB
ALBUMIN SERPL ELPH-MCNC: 3.3 G/DL — LOW (ref 3.5–5)
ALP SERPL-CCNC: 185 U/L — HIGH (ref 40–120)
ALT FLD-CCNC: 37 U/L DA — SIGNIFICANT CHANGE UP (ref 10–60)
ANION GAP SERPL CALC-SCNC: 11 MMOL/L — SIGNIFICANT CHANGE UP (ref 5–17)
AST SERPL-CCNC: 39 U/L — SIGNIFICANT CHANGE UP (ref 10–40)
BASOPHILS # BLD AUTO: 0.1 K/UL — SIGNIFICANT CHANGE UP (ref 0–0.2)
BASOPHILS NFR BLD AUTO: 1 % — SIGNIFICANT CHANGE UP (ref 0–2)
BILIRUB SERPL-MCNC: 0.2 MG/DL — SIGNIFICANT CHANGE UP (ref 0.2–1.2)
BUN SERPL-MCNC: 13 MG/DL — SIGNIFICANT CHANGE UP (ref 7–18)
CALCIUM SERPL-MCNC: 9.2 MG/DL — SIGNIFICANT CHANGE UP (ref 8.4–10.5)
CHLORIDE SERPL-SCNC: 108 MMOL/L — SIGNIFICANT CHANGE UP (ref 96–108)
CO2 SERPL-SCNC: 21 MMOL/L — LOW (ref 22–31)
CREAT SERPL-MCNC: 0.68 MG/DL — SIGNIFICANT CHANGE UP (ref 0.5–1.3)
EOSINOPHIL # BLD AUTO: 0.2 K/UL — SIGNIFICANT CHANGE UP (ref 0–0.5)
EOSINOPHIL NFR BLD AUTO: 2.3 % — SIGNIFICANT CHANGE UP (ref 0–6)
GLUCOSE SERPL-MCNC: 101 MG/DL — HIGH (ref 70–99)
HCT VFR BLD CALC: 38.8 % — LOW (ref 39–50)
HGB BLD-MCNC: 13.1 G/DL — SIGNIFICANT CHANGE UP (ref 13–17)
LYMPHOCYTES # BLD AUTO: 1.2 K/UL — SIGNIFICANT CHANGE UP (ref 1–3.3)
LYMPHOCYTES # BLD AUTO: 15 % — SIGNIFICANT CHANGE UP (ref 13–44)
MAGNESIUM SERPL-MCNC: 2.1 MG/DL — SIGNIFICANT CHANGE UP (ref 1.6–2.6)
MCHC RBC-ENTMCNC: 33.9 GM/DL — SIGNIFICANT CHANGE UP (ref 32–36)
MCHC RBC-ENTMCNC: 36.2 PG — HIGH (ref 27–34)
MCV RBC AUTO: 106.8 FL — HIGH (ref 80–100)
MONOCYTES # BLD AUTO: 0.7 K/UL — SIGNIFICANT CHANGE UP (ref 0–0.9)
MONOCYTES NFR BLD AUTO: 8.9 % — SIGNIFICANT CHANGE UP (ref 2–14)
NEUTROPHILS # BLD AUTO: 6 K/UL — SIGNIFICANT CHANGE UP (ref 1.8–7.4)
NEUTROPHILS NFR BLD AUTO: 72.8 % — SIGNIFICANT CHANGE UP (ref 43–77)
PHOSPHATE SERPL-MCNC: 3 MG/DL — SIGNIFICANT CHANGE UP (ref 2.5–4.5)
PLATELET # BLD AUTO: 253 K/UL — SIGNIFICANT CHANGE UP (ref 150–400)
POTASSIUM SERPL-MCNC: 4 MMOL/L — SIGNIFICANT CHANGE UP (ref 3.5–5.3)
POTASSIUM SERPL-SCNC: 4 MMOL/L — SIGNIFICANT CHANGE UP (ref 3.5–5.3)
PROT SERPL-MCNC: 7.7 G/DL — SIGNIFICANT CHANGE UP (ref 6–8.3)
RBC # BLD: 3.63 M/UL — LOW (ref 4.2–5.8)
RBC # FLD: 13 % — SIGNIFICANT CHANGE UP (ref 10.3–14.5)
SODIUM SERPL-SCNC: 140 MMOL/L — SIGNIFICANT CHANGE UP (ref 135–145)
WBC # BLD: 8.3 K/UL — SIGNIFICANT CHANGE UP (ref 3.8–10.5)
WBC # FLD AUTO: 8.3 K/UL — SIGNIFICANT CHANGE UP (ref 3.8–10.5)

## 2018-07-16 PROCEDURE — 99291 CRITICAL CARE FIRST HOUR: CPT | Mod: 25

## 2018-07-16 PROCEDURE — 95819 EEG AWAKE AND ASLEEP: CPT

## 2018-07-16 PROCEDURE — 80307 DRUG TEST PRSMV CHEM ANLYZR: CPT

## 2018-07-16 PROCEDURE — 80053 COMPREHEN METABOLIC PANEL: CPT

## 2018-07-16 PROCEDURE — 84100 ASSAY OF PHOSPHORUS: CPT

## 2018-07-16 PROCEDURE — 70450 CT HEAD/BRAIN W/O DYE: CPT

## 2018-07-16 PROCEDURE — 82962 GLUCOSE BLOOD TEST: CPT

## 2018-07-16 PROCEDURE — 83735 ASSAY OF MAGNESIUM: CPT

## 2018-07-16 PROCEDURE — 95957 EEG DIGITAL ANALYSIS: CPT

## 2018-07-16 PROCEDURE — 84443 ASSAY THYROID STIM HORMONE: CPT

## 2018-07-16 PROCEDURE — 86703 HIV-1/HIV-2 1 RESULT ANTBDY: CPT

## 2018-07-16 PROCEDURE — 82746 ASSAY OF FOLIC ACID SERUM: CPT

## 2018-07-16 PROCEDURE — 82607 VITAMIN B-12: CPT

## 2018-07-16 PROCEDURE — 84484 ASSAY OF TROPONIN QUANT: CPT

## 2018-07-16 PROCEDURE — 72125 CT NECK SPINE W/O DYE: CPT

## 2018-07-16 PROCEDURE — 83690 ASSAY OF LIPASE: CPT

## 2018-07-16 PROCEDURE — 80061 LIPID PANEL: CPT

## 2018-07-16 PROCEDURE — 85027 COMPLETE CBC AUTOMATED: CPT

## 2018-07-16 PROCEDURE — 93005 ELECTROCARDIOGRAM TRACING: CPT

## 2018-07-16 PROCEDURE — 83036 HEMOGLOBIN GLYCOSYLATED A1C: CPT

## 2018-07-16 RX ORDER — FOLIC ACID 0.8 MG
1 TABLET ORAL
Qty: 30 | Refills: 0 | OUTPATIENT
Start: 2018-07-16 | End: 2018-08-14

## 2018-07-16 RX ORDER — THIAMINE MONONITRATE (VIT B1) 100 MG
1 TABLET ORAL
Qty: 30 | Refills: 0 | OUTPATIENT
Start: 2018-07-16 | End: 2018-08-14

## 2018-07-16 RX ORDER — NICOTINE POLACRILEX 2 MG
1 GUM BUCCAL
Qty: 30 | Refills: 0 | OUTPATIENT
Start: 2018-07-16 | End: 2018-08-14

## 2018-07-16 RX ADMIN — PREGABALIN 1000 MICROGRAM(S): 225 CAPSULE ORAL at 12:14

## 2018-07-16 NOTE — DISCHARGE NOTE ADULT - CARE PLAN
Principal Discharge DX:	Alcohol withdrawal seizure with complication  Goal:	Prevention of DTs and further seizures  Assessment and plan of treatment:	You came to the hospital after having an episode of seizure. You reported at the time of admission that you drink a significant amount of alcohol, but did not drink in the days prior to the seizure. Your seizure was likely the result of alcohol withdrawal due to dependence. On discharge, continue taking folate and thiamine supplements as well as multivitamins. We strongly advise you to quit drinking alcohol and start attending Alcoholic's Anonymous meetings. Principal Discharge DX:	Alcohol withdrawal seizure with complication  Goal:	Prevention of DTs and further seizures  Assessment and plan of treatment:	You came to the hospital after having an episode of seizure. You reported at the time of admission that you drink a significant amount of alcohol, but did not drink in the days prior to the seizure. Your seizure was likely the result of alcohol withdrawal due to dependence. On discharge, continue taking folate and thiamine supplements as well as multivitamins. We strongly advise you to quit drinking alcohol and start attending Alcoholic's Anonymous meetings. Follow up with PCP within 1 week.

## 2018-07-16 NOTE — PROGRESS NOTE ADULT - SUBJECTIVE AND OBJECTIVE BOX
Patient is a 42y old  Male who presents with a chief complaint of Alcohol seizures (13 Jul 2018 05:21)    pt seen in tele [x  ], reg med floor [   ], bed [x ], chair at bedside [   ], a+o x3 [ x ], lethargic [  ],  nad [ x ]    no further c/o seiz activity    Allergies    No Known Allergies        Vitals    T(F): 98.2 (07-16-18 @ 07:24), Max: 98.6 (07-16-18 @ 04:39)  HR: 84 (07-16-18 @ 07:24) (70 - 85)  BP: 148/79 (07-16-18 @ 07:24) (135/74 - 163/87)  RR: 19 (07-16-18 @ 07:24) (16 - 20)  SpO2: 99% (07-16-18 @ 07:24) (97% - 100%)  Wt(kg): --  CAPILLARY BLOOD GLUCOSE          Labs                          13.1   8.3   )-----------( 253      ( 16 Jul 2018 06:56 )             38.8       07-16    140  |  108  |  13  ----------------------------<  101<H>  4.0   |  21<L>  |  0.68    Ca    9.2      16 Jul 2018 06:56  Phos  3.0     07-16  Mg     2.1     07-16    TPro  7.7  /  Alb  3.3<L>  /  TBili  0.2  /  DBili  x   /  AST  39  /  ALT  37  /  AlkPhos  185<H>  07-16                Radiology Results      Meds    MEDICATIONS  (STANDING):  cyanocobalamin 1000 MICROGram(s) Oral daily  LORazepam   Injectable 2 milliGRAM(s) IV Push every 4 hours      MEDICATIONS  (PRN):  LORazepam   Injectable 2 milliGRAM(s) IntraMuscular every 4 hours PRN Agitation      Physical Exam      Neuro :  no focal deficits  Respiratory: CTA B/L  CV: RRR, S1S2, no murmurs,   Abdominal: Soft, NT, ND +BS,  Extremities: No edema, + peripheral pulses    ASSESSMENT    etoh withdrawal seiz,,   h/o etoh abuse  Alcohol dependence with withdrawal        PLAN    d/c tele,   d/c ativan    cont vit b12 and thiamine  cont folic acid and multivitamins  neuro f/u  eeg with excessive fast activity on this EEG may be an effect of medications (ie benzodiazepines, barbiturates).  No epileptiform discharges or electrographic seizures were recorded noted above.  The patient has been advised that driving is not allowed for 1 year after a seizure by [employer] law.    advised etoh cesation  soc work cons  cont current meds  pt stable for d/c
Patient is a 42y old  Male who presents with a chief complaint of Alcohol seizures (13 Jul 2018 05:21)    pt seen in icu [  ], reg med floor [   ], bed [  ], chair at bedside [   ], a+o x3 [  ], lethargic [  ],  nad [  ]    corona [  ], ngt [  ], peg [  ], et tube [  ], cent line [  ], picc line [  ]        Allergies    No Known Allergies        Vitals    T(F): 98.9 (07-14-18 @ 07:29), Max: 99.1 (07-13-18 @ 20:17)  HR: 66 (07-14-18 @ 07:29) (63 - 76)  BP: 142/76 (07-14-18 @ 07:29) (110/64 - 155/73)  RR: 16 (07-14-18 @ 07:29) (16 - 20)  SpO2: 100% (07-14-18 @ 07:29) (98% - 100%)  Wt(kg): --  CAPILLARY BLOOD GLUCOSE      POCT Blood Glucose.: 102 mg/dL (12 Jul 2018 20:59)      Labs                          13.3   6.3   )-----------( 224      ( 14 Jul 2018 06:48 )             39.9       07-14    140  |  109<H>  |  5<L>  ----------------------------<  85  3.5   |  22  |  0.51    Ca    8.7      14 Jul 2018 06:48  Phos  3.5     07-14  Mg     1.9     07-14    TPro  6.5  /  Alb  2.9<L>  /  TBili  0.6  /  DBili  x   /  AST  52<H>  /  ALT  46  /  AlkPhos  185<H>  07-14      CARDIAC MARKERS ( 12 Jul 2018 22:27 )  <0.015 ng/mL / x     / x     / x     / x        EEG Impression/Clinical Correlate:  Abnormal EEG.  The excessive fast activity on this EEG may be an effect of medications (ie benzodiazepines, barbiturates).  No epileptiform discharges or electrographic seizures were recorded.          Radiology Results      Meds    MEDICATIONS  (STANDING):  cyanocobalamin 1000 MICROGram(s) Oral daily  LORazepam   Injectable 2 milliGRAM(s) IV Push every 4 hours  multivitamin/thiamine/folic acid in sodium chloride 0.9% 1000 milliLiter(s) (80 mL/Hr) IV Continuous <Continuous>  thiamine Injectable 100 milliGRAM(s) IntraMuscular daily      MEDICATIONS  (PRN):  LORazepam   Injectable 2 milliGRAM(s) IntraMuscular every 4 hours PRN Agitation      Physical Exam    Neuro :  no focal deficits  Respiratory: CTA B/L  CV: RRR, S1S2, no murmurs,   Abdominal: Soft, NT, ND +BS,  Extremities: No edema, + peripheral pulses    ASSESSMENT    etoh withdrawal seiz,,   h/o etoh abuse  Alcohol dependence with withdrawal        PLAN    cont tele,   ativan as per [facility] protocol,   neuro f/u  eeg with excessive fast activity on this EEG may be an effect of medications (ie benzodiazepines, barbiturates).  No epileptiform discharges or electrographic seizures were recorded noted above.  The patient has been advised that driving is not allowed for 1 year after a seizure by [employer] law.    cont current meds
Patient is a 42y old  Male who presents with a chief complaint of Alcohol seizures (13 Jul 2018 05:21)    pt seen in tele [x  ], reg med floor [   ], bed [x ], chair at bedside [   ], a+o x3 [ x ], lethargic [  ],  nad [ x ]    no further c/o seiz activity        Allergies    No Known Allergies        Vitals    T(F): 98.2 (07-15-18 @ 07:54), Max: 98.9 (07-14-18 @ 11:25)  HR: 69 (07-15-18 @ 07:54) (65 - 79)  BP: 150/95 (07-15-18 @ 07:54) (123/77 - 174/75)  RR: 16 (07-15-18 @ 07:54) (16 - 20)  SpO2: 99% (07-15-18 @ 07:54) (99% - 100%)  Wt(kg): --  CAPILLARY BLOOD GLUCOSE          Labs                          13.3   6.3   )-----------( 224      ( 14 Jul 2018 06:48 )             39.9       07-15    138  |  106  |  10  ----------------------------<  99  3.5   |  22  |  0.59    Ca    8.7      15 Jul 2018 07:40  Phos  2.8     07-15  Mg     1.9     07-15    TPro  6.8  /  Alb  2.9<L>  /  TBili  0.3  /  DBili  x   /  AST  42<H>  /  ALT  40  /  AlkPhos  176<H>  07-15                Radiology Results      Meds    MEDICATIONS  (STANDING):  cyanocobalamin 1000 MICROGram(s) Oral daily  LORazepam   Injectable 2 milliGRAM(s) IV Push every 4 hours  thiamine Injectable 100 milliGRAM(s) IntraMuscular daily      MEDICATIONS  (PRN):  LORazepam   Injectable 2 milliGRAM(s) IntraMuscular every 4 hours PRN Agitation      Physical Exam      Neuro :  no focal deficits  Respiratory: CTA B/L  CV: RRR, S1S2, no murmurs,   Abdominal: Soft, NT, ND +BS,  Extremities: No edema, + peripheral pulses    ASSESSMENT    etoh withdrawal seiz,,   h/o etoh abuse  Alcohol dependence with withdrawal        PLAN    d/c tele,   ativan as per [facility] protocol,   cont vit b12 and thiamine  add folic acid and multivitamins  neuro f/u  eeg with excessive fast activity on this EEG may be an effect of medications (ie benzodiazepines, barbiturates).  No epileptiform discharges or electrographic seizures were recorded noted above.  The patient has been advised that driving is not allowed for 1 year after a seizure by [employer] law.    advised etoh cesation  soc work cons  cont current meds
PGY 1 Note discussed with supervising resident and primary attending    Patient is a 42y old  Male who presents with a chief complaint of Alcohol seizures (13 Jul 2018 05:21)      INTERVAL HPI/OVERNIGHT EVENTS:   Patient seen at the bedside. Patient is sitting in the bed, agitated and complainsof IV line hurting.  Denies any other complains.    MEDICATIONS  (STANDING):  cyanocobalamin 1000 MICROGram(s) Oral daily  LORazepam   Injectable 2 milliGRAM(s) IV Push every 4 hours  multivitamin/thiamine/folic acid in sodium chloride 0.9% 1000 milliLiter(s) (80 mL/Hr) IV Continuous <Continuous>  thiamine Injectable 100 milliGRAM(s) IntraMuscular daily    MEDICATIONS  (PRN):  LORazepam   Injectable 2 milliGRAM(s) IntraMuscular every 4 hours PRN Agitation      __________________________________________________  REVIEW OF SYSTEMS:    CONSTITUTIONAL: No fever,   EYES: no acute visual disturbances  NECK: No pain or stiffness  RESPIRATORY: No cough; No shortness of breath  CARDIOVASCULAR: No chest pain, no palpitations  GASTROINTESTINAL: No pain. No nausea or vomiting; No diarrhea   NEUROLOGICAL: No headache or numbness, no tremors  MUSCULOSKELETAL: No joint pain, no muscle pain  GENITOURINARY: no dysuria, no frequency, no hesitancy  PSYCHIATRY: no depression , no anxiety  ALL OTHER  ROS negative        Vital Signs Last 24 Hrs  T(C): 36.9 (14 Jul 2018 15:15), Max: 37.3 (13 Jul 2018 20:17)  T(F): 98.4 (14 Jul 2018 15:15), Max: 99.1 (13 Jul 2018 20:17)  HR: 79 (14 Jul 2018 15:15) (63 - 79)  BP: 131/85 (14 Jul 2018 15:15) (110/64 - 155/73)  BP(mean): --  RR: 18 (14 Jul 2018 15:15) (16 - 18)  SpO2: 100% (14 Jul 2018 15:15) (99% - 100%)    ________________________________________________  PHYSICAL EXAM:  GENERAL: NAD  HEENT: Normocephalic;  conjunctivae and sclerae clear; moist mucous membranes;   NECK : supple  CHEST/LUNG: Clear to auscultation bilaterally with good air entry   HEART: S1 S2  regular; no murmurs, gallops or rubs  ABDOMEN: Soft, Nontender, Nondistended; Bowel sounds present  EXTREMITIES: no cyanosis; no edema; no calf tenderness  SKIN: warm and dry; no rash  NERVOUS SYSTEM:  Awake and alert; Oriented  to place, person and time ; no new deficits    _________________________________________________  LABS:                        13.3   6.3   )-----------( 224      ( 14 Jul 2018 06:48 )             39.9     07-14    140  |  109<H>  |  5<L>  ----------------------------<  85  3.5   |  22  |  0.51    Ca    8.7      14 Jul 2018 06:48  Phos  3.5     07-14  Mg     1.9     07-14    TPro  6.5  /  Alb  2.9<L>  /  TBili  0.6  /  DBili  x   /  AST  52<H>  /  ALT  46  /  AlkPhos  185<H>  07-14        CAPILLARY BLOOD GLUCOSE            RADIOLOGY & ADDITIONAL TESTS:    Imaging Personally Reviewed:  YES/NO    Consultant(s) Notes Reviewed:   YES/ No    Care Discussed with Consultants :     Plan of care was discussed with patient and /or primary care giver; all questions and concerns were addressed and care was aligned with patient's wishes.

## 2018-07-16 NOTE — DISCHARGE NOTE ADULT - HOSPITAL COURSE
Mr. Saeed Bennett is a 42 year old male who presented after an episode of seizure after not drinking for a few days prior to the episode. Patient was admitted for alcohol dependence with withdrawal and placed on CIWA protocol. CT head and C-spine negative. MCV found to be elevated at 107.8, secondary to nutritional deficiency. Potassium found to be low at 2.9. Patient placed on telemetry. Potassium replaced. On day 2, patient reported experiencing tremors but denied any other complaints. On day 4, patient denies any symptoms and reports feeling ready to go home. Potassium stable at 3.5.     Patient is clinically stable for discharge home, with recommendation to quit drinking alcohol. Patient agrees to the plan. Discussed with Dr. Espino.

## 2018-07-16 NOTE — DISCHARGE NOTE ADULT - PLAN OF CARE
Prevention of DTs and further seizures You came to the hospital after having an episode of seizure. You reported at the time of admission that you drink a significant amount of alcohol, but did not drink in the days prior to the seizure. Your seizure was likely the result of alcohol withdrawal due to dependence. On discharge, continue taking folate and thiamine supplements as well as multivitamins. We strongly advise you to quit drinking alcohol and start attending Alcoholic's Anonymous meetings. You came to the hospital after having an episode of seizure. You reported at the time of admission that you drink a significant amount of alcohol, but did not drink in the days prior to the seizure. Your seizure was likely the result of alcohol withdrawal due to dependence. On discharge, continue taking folate and thiamine supplements as well as multivitamins. We strongly advise you to quit drinking alcohol and start attending Alcoholic's Anonymous meetings. Follow up with PCP within 1 week.

## 2018-07-16 NOTE — DISCHARGE NOTE ADULT - PATIENT PORTAL LINK FT
You can access the [facility], offered by [facility], by registering with the following website: [email or web address]

## 2018-07-16 NOTE — DISCHARGE NOTE ADULT - MEDICATION SUMMARY - MEDICATIONS TO TAKE
I will START or STAY ON the medications listed below when I get home from the hospital:    nicotine 21 mg-14 mg-7 mg transdermal film, extended release  -- 1 each transdermally once a day   -- Do not take this drug if you are pregnant.  For external use only.  It is very important that you take or use this exactly as directed.  Do not skip doses or discontinue unless directed by your doctor.  Remove old patch prior to applying a new patch.    -- Indication: For Quitting smoking    One-A-Day Men's Health Formula oral tablet  -- 1 tab(s) by mouth once a day   -- Indication: For Supplement    folic acid 1 mg oral tablet  -- 1 tab(s) by mouth once a day   -- Indication: For Deficiency    thiamine 100 mg oral tablet  -- 1 tab(s) by mouth once a day   -- Indication: For Deficiency

## 2018-07-16 NOTE — DISCHARGE NOTE ADULT - PROVIDER TOKENS
FREE:[LAST:[N/A],PHONE:[(   )    -],FAX:[(   )    -],ADDRESS:[Follow up with your primary doctor in 2 weeks.]]

## 2018-07-16 NOTE — DISCHARGE NOTE ADULT - ADDITIONAL INSTRUCTIONS
Follow up with your primary doctor in 2 weeks. We strongly advise you to quit drinking alcohol and take your vitamins as prescribed.

## 2020-03-13 NOTE — ED PROVIDER NOTE - DISPOSITION TYPE
Nice meeting you today, Jeffrey.    The rapid flu test resulted as negative. There is no indication to treat with Tamiflu.  Manage myalgias with Tylenol. If you develop a fever and worsening symptoms, feel free to return to [facility] where rapid flu can be repeated.  Emilie Rodriguez MD    
ADMIT
